# Patient Record
Sex: MALE | Race: WHITE | URBAN - METROPOLITAN AREA
[De-identification: names, ages, dates, MRNs, and addresses within clinical notes are randomized per-mention and may not be internally consistent; named-entity substitution may affect disease eponyms.]

---

## 2017-02-02 ENCOUNTER — OFFICE VISIT (OUTPATIENT)
Dept: INTERNAL MEDICINE CLINIC | Age: 27
End: 2017-02-02

## 2017-02-02 VITALS
WEIGHT: 159.9 LBS | DIASTOLIC BLOOD PRESSURE: 72 MMHG | TEMPERATURE: 97.9 F | OXYGEN SATURATION: 99 % | HEIGHT: 73 IN | HEART RATE: 61 BPM | SYSTOLIC BLOOD PRESSURE: 111 MMHG | BODY MASS INDEX: 21.19 KG/M2

## 2017-02-02 DIAGNOSIS — Z00.00 PHYSICAL EXAM: ICD-10-CM

## 2017-02-02 DIAGNOSIS — H60.61 CHRONIC OTITIS EXTERNA OF RIGHT EAR, UNSPECIFIED TYPE: ICD-10-CM

## 2017-02-02 DIAGNOSIS — L70.0 PUSTULAR ACNE: Primary | ICD-10-CM

## 2017-02-02 RX ORDER — DOXYCYCLINE 100 MG/1
100 TABLET ORAL DAILY
Qty: 30 TAB | Refills: 2 | Status: SHIPPED | OUTPATIENT
Start: 2017-02-02 | End: 2017-02-12

## 2017-02-02 NOTE — PROGRESS NOTES
Chief Complaint   Patient presents with    New Patient    Ear Pain    Skin Problem   . SUBECTIVE:    Chad Reveles is a 27 y.o. male comes in for a physical examination. He has had a vague sensation and pain in his right ear for the last four to six months. He went to [facility] and was given antibiotics and a topical steroid preparation, but no improvement occurred. He has also had recurrent acneiform-type lesions of his neck and back. 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 Current Outpatient Prescriptions   Medication Sig Dispense Refill    doxycycline (ADOXA) 100 mg tablet Take 1 Tab by mouth daily for 10 days. 30 Tab 2     History reviewed. No pertinent past medical history.   Past Surgical History   Procedure Laterality Date    Hx tonsillectomy Bilateral 24 years ago     No Known Allergies    REVIEW OF SYSTEMS:  Review of Systems - Negative except   ENT ROS: negative for - headaches, hearing change, nasal congestion, oral lesions, tinnitus, visual changes or   Respiratory ROS: no cough, shortness of breath, or wheezing  Cardiovascular ROS: no chest pain or dyspnea on exertion  Gastrointestinal ROS: no abdominal pain, change in bowel habits, or black or blood  Genito-Urinary ROS: no dysuria, trouble voiding, or hematuria  Musculoskeletal ROS: negative  Neurological ROS: no TIA or stroke symptoms      Social History     Social History    Marital status: SINGLE     Spouse name: N/A    Number of children: N/A    Years of education: N/A     Social History Main Topics    Smoking status: Never Smoker    Smokeless tobacco: Former User      Comment: hooka    Alcohol use 1.8 oz/week     1 Glasses of wine, 1 Cans of beer, 1 Shots of liquor per week    Drug use: No    Sexual activity: Yes     Partners: Female     Other Topics Concern    None     Social History Narrative   r  Family History   Problem Relation Age of Onset   Godfrey Garland Arthritis-rheumatoid Mother     Alzheimer Maternal Grandmother        OBJECTIVE:  Visit Vitals    /72 (BP 1 Location: Left arm, BP Patient Position: Sitting)    Pulse 61    Temp 97.9 °F (36.6 °C) (Oral)    Ht 6' 1\" (1.854 m)    Wt 159 lb 14.4 oz (72.5 kg)    SpO2 99%    BMI 21.1 kg/m2     ENT: perrla,  eom intact  NECK: supple. Thyroid normal, no JVD  CHEST: clear to ascultation and percussion   HEART: regular rate and rhythm  ABD: soft, bowel sounds active,   EXTREMITIES: no edema, pulse 1+  INTEGUMENT: Actiniform lesions neck and upper back, significant eschar formation on chest and back  Genitalia: Normal male      ASSESSMENT:  1. Pustular acne    2. Chronic otitis externa of right ear, unspecified type    3. Physical exam        PLAN:    1. The patient has acne leading to the lesions on his neck and back. He will be referred to a dermatologist for this. In the intervening time, I have given him doxycycline 100 mg daily. He asks me for a topical preparation, but I will wait until he sees the dermatologist.                  2. He has chronic otitis externa and will be referred to an ENT physician. .  Orders Placed This Encounter    CBC WITH AUTOMATED DIFF    LIPID PANEL    METABOLIC PANEL, COMPREHENSIVE    URINALYSIS W/ RFLX MICROSCOPIC    REFERRAL TO DERMATOLOGY    REFERRAL TO ENT-OTOLARYNGOLOGY    doxycycline (ADOXA) 100 mg tablet       Follow-up Disposition:  Return if symptoms worsen or fail to improve.       Chilo Skelton MD

## 2017-02-02 NOTE — PROGRESS NOTES
Chief Complaint   Patient presents with    New Patient    Ear Pain    Skin Problem     1. Have you been to the ER, urgent care clinic since your last visit? Hospitalized since your last visit? Yes Reason for visit: ear pain abour 5 months ago    2. Have you seen or consulted any other health care providers outside of the [address] [place] [address] since your last visit? Include any pap smears or colon screening.  Yes Where: [facility] [place], [place]

## 2017-02-02 NOTE — MR AVS SNAPSHOT
Visit Information Date & Time Provider Department Dept. Phone Encounter #  
 2/2/2017 11:00 AM Jeanne Romo MD [facility] [address] [id number] Upcoming Health Maintenance Date Due DTaP/Tdap/Td series (1 - Tdap) 11/28/2009 INFLUENZA AGE 9 TO ADULT 8/1/2016 Allergies as of 2/2/2017  Review Complete On: 2/2/2017 By: Nikkie Woodard No Known Allergies Current Immunizations  Never Reviewed No immunizations on file. Not reviewed this visit You Were Diagnosed With   
  
 Codes Comments Pustular acne    -  Primary ICD-10-CM: L70.0 ICD-9-CM: 706.1 Chronic otitis externa of right ear, unspecified type     ICD-10-CM: H60.61 ICD-9-CM: 380.23 Physical exam     ICD-10-CM: Z00.00 ICD-9-CM: V70.9 Vitals BP Pulse Temp Height(growth percentile) Weight(growth percentile) SpO2  
 111/72 (BP 1 Location: Left arm, BP Patient Position: Sitting) 61 97.9 °F (36.6 °C) (Oral) 6' 1\" (1.854 m) 159 lb 14.4 oz (72.5 kg) 99% BMI Smoking Status 21.1 kg/m2 Never Smoker BMI and BSA Data Body Mass Index Body Surface Area  
 21.1 kg/m 2 1.93 m 2 Preferred Pharmacy Pharmacy Name Phone [id number] [address] [phone] Your Updated Medication List  
  
   
This list is accurate as of: 2/2/17  1:02 PM.  Always use your most recent med list.  
  
  
  
  
 doxycycline 100 mg tablet Commonly known as:  ADOXA Take 1 Tab by mouth daily for 10 days. Prescriptions Sent to Pharmacy Refills  
 doxycycline (ADOXA) 100 mg tablet 2 Sig: Take 1 Tab by mouth daily for 10 days. Class: Normal  
 Pharmacy: [facility] [address]  #: [phone] Route: Oral  
  
We Performed the Following CBC WITH AUTOMATED DIFF [81947 CPT(R)] LIPID PANEL [45654 CPT(R)] METABOLIC PANEL, COMPREHENSIVE [92149 CPT(R)] NE COLLECTION VENOUS BLOOD,VENIPUNCTURE [id number] CPT(R)] REFERRAL TO DERMATOLOGY [REF19 Custom] Comments:  
 Please evaluate patient for pustular acne REFERRAL TO ENT-OTOLARYNGOLOGY [WFQ91 Custom] Comments:  
 Please evaluate patient for chronic otitis media. URINALYSIS W/ RFLX MICROSCOPIC [89521 CPT(R)] Referral Information Referral ID Referred By Referred To  
  
 [id number] MAGGY Ramirez MD   
   [address] [facility] [address] [place] [address] Ave Phone: [phone] Fax: [phone] Visits Status Start Date End Date 1 New Request 2/2/17 2/2/18 If your referral has a status of pending review or denied, additional information will be sent to support the outcome of this decision. Referral ID Referred By Referred To  
 [id number] Renetta Sydnie GRUBBS Not Available Visits Status Start Date End Date 1 New Request 2/2/17 2/2/18 If your referral has a status of pending review or denied, additional information will be sent to support the outcome of this decision. Introducing [facility]! Gennaro Treviño introduces [employer] patient portal. Now you can access parts of your medical record, email your doctor's office, and request medication refills online. 1. In your internet browser, go to [email or web address] 2. Click on the First Time User? Click Here link in the Sign In box. You will see the New Member Sign Up page. 3. Enter your [employer] Access Code exactly as it appears below. You will not need to use this code after youve completed the sign-up process. If you do not sign up before the expiration date, you must request a new code. · [employer] Access Code: [email or web address] Expires: 5/3/2017  1:02 PM 
 
 4. Enter the last four digits of your Social Security Number (xxxx) and Date of Birth (mm/dd/yyyy) as indicated and click Submit. You will be taken to the next sign-up page. 5. Create a [employer] ID. This will be your [employer] login ID and cannot be changed, so think of one that is secure and easy to remember. 6. Create a [employer] password. You can change your password at any time. 7. Enter your Password Reset Question and Answer. This can be used at a later time if you forget your password. 8. Enter your e-mail address. You will receive e-mail notification when new information is available in [address] 9. Click Sign Up. You can now view and download portions of your medical record. 10. Click the Download Summary menu link to download a portable copy of your medical information. If you have questions, please visit the Frequently Asked Questions section of the [employer] website. Remember, [employer] is NOT to be used for urgent needs. For medical emergencies, dial 911. Now available from your iPhone and Android! Please provide this summary of care documentation to your next provider. If you have any questions after today's visit, please call [phone].

## 2017-02-03 PROBLEM — L70.0 PUSTULAR ACNE: Status: ACTIVE | Noted: 2017-02-03

## 2017-02-03 PROBLEM — H60.61 CHRONIC OTITIS EXTERNA OF RIGHT EAR: Status: ACTIVE | Noted: 2017-02-03
